# Patient Record
Sex: MALE | Race: WHITE | NOT HISPANIC OR LATINO | Employment: FULL TIME | URBAN - METROPOLITAN AREA
[De-identification: names, ages, dates, MRNs, and addresses within clinical notes are randomized per-mention and may not be internally consistent; named-entity substitution may affect disease eponyms.]

---

## 2020-05-22 ENCOUNTER — HOSPITAL ENCOUNTER (OUTPATIENT)
Dept: GENERAL RADIOLOGY | Facility: HOSPITAL | Age: 30
Discharge: HOME | End: 2020-05-22

## 2020-05-22 ENCOUNTER — HOSPITAL ENCOUNTER (OUTPATIENT)
Dept: GENERAL RADIOLOGY | Facility: HOSPITAL | Age: 30
Discharge: HOME | End: 2020-05-22
Admitting: FAMILY MEDICINE

## 2020-05-22 ENCOUNTER — TELEPHONE (OUTPATIENT)
Dept: FAMILY MEDICINE CLINIC | Facility: CLINIC | Age: 30
End: 2020-05-22

## 2020-05-22 ENCOUNTER — OFFICE VISIT (OUTPATIENT)
Dept: FAMILY MEDICINE CLINIC | Facility: CLINIC | Age: 30
End: 2020-05-22

## 2020-05-22 VITALS
BODY MASS INDEX: 29.38 KG/M2 | HEART RATE: 60 BPM | RESPIRATION RATE: 16 BRPM | HEIGHT: 70 IN | WEIGHT: 205.2 LBS | TEMPERATURE: 98.1 F | DIASTOLIC BLOOD PRESSURE: 60 MMHG | OXYGEN SATURATION: 98 % | SYSTOLIC BLOOD PRESSURE: 112 MMHG

## 2020-05-22 DIAGNOSIS — R19.7 DIARRHEA, UNSPECIFIED TYPE: ICD-10-CM

## 2020-05-22 DIAGNOSIS — M79.604 RIGHT LEG PAIN: Primary | ICD-10-CM

## 2020-05-22 PROCEDURE — 99203 OFFICE O/P NEW LOW 30 MIN: CPT | Performed by: FAMILY MEDICINE

## 2020-05-22 PROCEDURE — 72110 X-RAY EXAM L-2 SPINE 4/>VWS: CPT

## 2020-05-22 PROCEDURE — 73502 X-RAY EXAM HIP UNI 2-3 VIEWS: CPT

## 2020-05-22 RX ORDER — FLUOXETINE HYDROCHLORIDE 20 MG/1
CAPSULE ORAL
COMMUNITY
Start: 2019-07-03

## 2020-05-22 RX ORDER — BUPROPION HYDROCHLORIDE 150 MG/1
TABLET ORAL
COMMUNITY
Start: 2019-07-03

## 2020-05-22 RX ORDER — DICYCLOMINE HYDROCHLORIDE 10 MG/1
10 CAPSULE ORAL
Qty: 60 CAPSULE | Refills: 1 | Status: SHIPPED | OUTPATIENT
Start: 2020-05-22

## 2020-05-22 NOTE — TELEPHONE ENCOUNTER
----- Message from Ashley Mallory MD sent at 5/22/2020  2:48 PM EDT -----  Please let patient know that his x-rays are normal.  Thanks.

## 2020-05-23 LAB
ALBUMIN SERPL-MCNC: 5 G/DL (ref 4.1–5.2)
ALBUMIN/GLOB SERPL: 1.8 {RATIO} (ref 1.2–2.2)
ALP SERPL-CCNC: 90 IU/L (ref 39–117)
ALT SERPL-CCNC: 50 IU/L (ref 0–44)
AST SERPL-CCNC: 31 IU/L (ref 0–40)
BASOPHILS # BLD AUTO: 0 X10E3/UL (ref 0–0.2)
BASOPHILS NFR BLD AUTO: 1 %
BILIRUB SERPL-MCNC: 0.3 MG/DL (ref 0–1.2)
BUN SERPL-MCNC: 10 MG/DL (ref 6–20)
BUN/CREAT SERPL: 13 (ref 9–20)
CALCIUM SERPL-MCNC: 9.8 MG/DL (ref 8.7–10.2)
CHLORIDE SERPL-SCNC: 101 MMOL/L (ref 96–106)
CO2 SERPL-SCNC: 25 MMOL/L (ref 20–29)
CREAT SERPL-MCNC: 0.78 MG/DL (ref 0.76–1.27)
CRP SERPL-MCNC: 2 MG/L (ref 0–10)
EOSINOPHIL # BLD AUTO: 0.2 X10E3/UL (ref 0–0.4)
EOSINOPHIL NFR BLD AUTO: 3 %
ERYTHROCYTE [DISTWIDTH] IN BLOOD BY AUTOMATED COUNT: 13.1 % (ref 11.6–15.4)
ERYTHROCYTE [SEDIMENTATION RATE] IN BLOOD BY WESTERGREN METHOD: 2 MM/HR (ref 0–15)
GLOBULIN SER CALC-MCNC: 2.8 G/DL (ref 1.5–4.5)
GLUCOSE SERPL-MCNC: 90 MG/DL (ref 65–99)
HCT VFR BLD AUTO: 46.4 % (ref 37.5–51)
HGB BLD-MCNC: 15.6 G/DL (ref 13–17.7)
IMM GRANULOCYTES # BLD AUTO: 0 X10E3/UL (ref 0–0.1)
IMM GRANULOCYTES NFR BLD AUTO: 0 %
LYMPHOCYTES # BLD AUTO: 1.8 X10E3/UL (ref 0.7–3.1)
LYMPHOCYTES NFR BLD AUTO: 25 %
MCH RBC QN AUTO: 29.7 PG (ref 26.6–33)
MCHC RBC AUTO-ENTMCNC: 33.6 G/DL (ref 31.5–35.7)
MCV RBC AUTO: 88 FL (ref 79–97)
MONOCYTES # BLD AUTO: 0.6 X10E3/UL (ref 0.1–0.9)
MONOCYTES NFR BLD AUTO: 9 %
NEUTROPHILS # BLD AUTO: 4.5 X10E3/UL (ref 1.4–7)
NEUTROPHILS NFR BLD AUTO: 62 %
PLATELET # BLD AUTO: 326 X10E3/UL (ref 150–450)
POTASSIUM SERPL-SCNC: 4.4 MMOL/L (ref 3.5–5.2)
PROT SERPL-MCNC: 7.8 G/DL (ref 6–8.5)
RBC # BLD AUTO: 5.25 X10E6/UL (ref 4.14–5.8)
SODIUM SERPL-SCNC: 140 MMOL/L (ref 134–144)
WBC # BLD AUTO: 7.1 X10E3/UL (ref 3.4–10.8)

## 2020-05-28 ENCOUNTER — TELEPHONE (OUTPATIENT)
Dept: FAMILY MEDICINE CLINIC | Facility: CLINIC | Age: 30
End: 2020-05-28

## 2020-05-28 NOTE — TELEPHONE ENCOUNTER
----- Message from Ashley Mallory MD sent at 5/25/2020 10:45 PM EDT -----  Please let patient know that all labs are normal except for one liver enzyme.  I'd like to recheck this level at his next visit to see if it returns to normal.  I do recommend modest weight loss and lower fat diet.  Thanks.

## 2020-06-01 ENCOUNTER — TREATMENT (OUTPATIENT)
Dept: PHYSICAL THERAPY | Facility: CLINIC | Age: 30
End: 2020-06-01

## 2020-06-01 DIAGNOSIS — M54.50 RIGHT LOW BACK PAIN, UNSPECIFIED CHRONICITY, UNSPECIFIED WHETHER SCIATICA PRESENT: ICD-10-CM

## 2020-06-01 DIAGNOSIS — M79.604 RIGHT LEG PAIN: Primary | ICD-10-CM

## 2020-06-01 PROCEDURE — 97035 APP MDLTY 1+ULTRASOUND EA 15: CPT | Performed by: PHYSICAL THERAPIST

## 2020-06-01 PROCEDURE — 97110 THERAPEUTIC EXERCISES: CPT | Performed by: PHYSICAL THERAPIST

## 2020-06-01 PROCEDURE — 97161 PT EVAL LOW COMPLEX 20 MIN: CPT | Performed by: PHYSICAL THERAPIST

## 2020-06-01 NOTE — PROGRESS NOTES
Physical Therapy Daily Progress Note          Physical Therapy Initial Evaluation and Plan of Care    Patient: Rickey Valverde   : 1990  Diagnosis/ICD-10 Code:  Right leg pain [M79.604]  Referring practitioner: Aslhey Fontaine*  Date of Initial Visit: 2020  Today's Date: 2020  Patient seen for 1 sessions           Subjective Questionnaire: Oswestry: 28%(14)  [place] may be a better indicator  Several month hx of right post thigh/leg pain, no known injury, comes and goes, increased with prolonged sitting.  Denies back pain this date.  Works as a  and does not interrupt work most times      Subjective Evaluation    History of Present Illness  Mechanism of injury: unknown      Patient Occupation:  Quality of life: good    Pain  Current pain rating: 3  Quality: dull ache, radiating, discomfort and tight  Relieving factors: rest and change in position  Aggravating factors: ambulation, squatting, movement, repetitive movement and prolonged positioning  Progression: no change    Diagnostic Tests  X-ray: normal    Treatments  No previous or current treatments  Patient Goals  Patient goals for therapy: decreased pain and increased motion             Objective          Palpation     Additional Palpation Details  Lumbar clears with ROM and strength, no produced pain.  Tender to palpate right post thigh/hamstring mid to distal region    Active Range of Motion     Additional Active Range of Motion Details  Lumbar ROM not restrictions noted; however, SLR right to 60 degrees and tender vs left to 90 degrees without pain    Strength/Myotome Testing     Additional Strength Details  Resisted right hamstring in prone + for increased tenderness, no myotome limits noted    Ambulation     Ambulation: Level Surfaces     Additional Level Surfaces Ambulation Details  Normal gait          Assessment & Plan     Assessment  Impairments: abnormal or restricted ROM, lacks appropriate home exercise program  and pain with function  Assessment details: + hamstring tightness/pain right, back clears, no pain noted in back.  Patient will benefit from PT for use of modalities as needed to reduce pain and improve mobility, active and passive stretching and instruction in HEP.  May need mild hamstring strength program at completion  Prognosis: good  Functional Limitations: walking, uncomfortable because of pain and sitting  Goals  Plan Goals: STG  Right hamstring mobility improved to SLR of 75 degrees in 2 weeks    LTG  Right hamstring mobility = to left near 90 degrees by discharge           Reported pain to consistent 1 or 2 by discharge with no issues with sitting    Plan  Therapy options: will be seen for skilled physical therapy services  Planned modality interventions: ultrasound and electrical stimulation/Russian stimulation  Planned therapy interventions: strengthening, stretching, home exercise program, flexibility and soft tissue mobilization  Treatment plan discussed with: patient  Plan details: Suggest PT 1X weekly X 6 visits to reach above goals        Timed:         Manual Therapy:    0     mins  39284;     Therapeutic Exercise:    15     mins  02359;     Neuromuscular Sanjana:    0    mins  01094;    Therapeutic Activity:     0     mins  13899;     Gait Trainin     mins  38241;     Ultrasound:     10     mins  90747;    Ionto                               0    mins   41618    Un-Timed:  Electrical Stimulation:    0     mins  80029 ( );  Dry Needling     0     mins self-pay  Traction     0     mins 64350  Low Eval     15     Mins  79373  Mod Eval     0     Mins  20176  High Eval                       0     Mins  79739        Timed Treatment:   25   mins   Total Treatment:     40   mins    PT SIGNATURE: Sandra Whitfield, PT   DATE TREATMENT INITIATED: 2020    Initial Certification  Certification Period: 2020  I certify that the therapy services are furnished while this patient is under my care.   The services outlined above are required by this patient, and will be reviewed every 90 days.     PHYSICIAN: Ashley Mallory MD      DATE:     Please sign and return via fax to [phone].. Thank you, [facility].

## 2020-06-08 ENCOUNTER — TREATMENT (OUTPATIENT)
Dept: PHYSICAL THERAPY | Facility: CLINIC | Age: 30
End: 2020-06-08

## 2020-06-08 DIAGNOSIS — M79.604 RIGHT LEG PAIN: Primary | ICD-10-CM

## 2020-06-08 PROCEDURE — 97110 THERAPEUTIC EXERCISES: CPT | Performed by: PHYSICAL THERAPIST

## 2020-06-08 PROCEDURE — 97035 APP MDLTY 1+ULTRASOUND EA 15: CPT | Performed by: PHYSICAL THERAPIST

## 2020-06-08 NOTE — PROGRESS NOTES
Physical Therapy Daily Progress Note        Patient: Rickey Valverde   : 1990  Diagnosis/ICD-10 Code:  Right leg pain [M79.604]  Referring practitioner: Ashley Fontaine*  Date of Initial Visit: Type: THERAPY  Noted: 2020  Today's Date: 2020  Patient seen for 2 sessions         Rickey Valverde reports: having good response to initial visit with decreased aching.       Subjective     Objective   See Exercise, Manual, and Modality Logs for complete treatment. ADDED HS curls gentle strengthening.       Assessment/Plan Patient provided proper return demonstration with reviewed active stretches with HEP. Patient demonstrated improved R HS mobility with SLR to 75 degrees roughly following active stretching and modalities.     Progress per Plan of Care           Manual Therapy:         mins  06316;  Therapeutic Exercise:    15     mins  08040;     Neuromuscular Sanjana:        mins  41741;    Therapeutic Activity:          mins  19850;     Gait Training:           mins  62524;     Ultrasound:     10     mins  11181;    Electrical Stimulation:         mins  34285 ( );  Dry Needling          mins self-pay    Timed Treatment:   25   mins   Total Treatment:     25   mins    Johnny Sanchez PTA  Physical Therapist

## 2020-09-15 ENCOUNTER — DOCUMENTATION (OUTPATIENT)
Dept: PHYSICAL THERAPY | Facility: CLINIC | Age: 30
End: 2020-09-15

## 2020-09-15 NOTE — PROGRESS NOTES
Discharge Summary  Discharge Summary from Physical Therapy Report      Dates  PT visit: last visit 6/8/20  Number of Visits: 2     Discharge Status of Patient: See  Note dated 6/8/20    Goals: Partially Met    Discharge Plan: patient failed to return after visit 2    Comments present status unknown    Date of Discharge 6/8/20        Sandra Whitfield, PT  Physical Therapist